# Patient Record
Sex: MALE | Race: BLACK OR AFRICAN AMERICAN | URBAN - METROPOLITAN AREA
[De-identification: names, ages, dates, MRNs, and addresses within clinical notes are randomized per-mention and may not be internally consistent; named-entity substitution may affect disease eponyms.]

---

## 2019-03-07 ENCOUNTER — INPATIENT (INPATIENT)
Facility: HOSPITAL | Age: 74
LOS: 5 days | Discharge: NURSING, REHAB OR LONG-TERM CARE | End: 2019-03-13
Attending: INTERNAL MEDICINE | Admitting: INTERNAL MEDICINE

## 2019-03-07 VITALS
DIASTOLIC BLOOD PRESSURE: 100 MMHG | SYSTOLIC BLOOD PRESSURE: 178 MMHG | TEMPERATURE: 97 F | RESPIRATION RATE: 18 BRPM | OXYGEN SATURATION: 98 % | HEART RATE: 100 BPM

## 2019-03-07 LAB
ANION GAP SERPL CALC-SCNC: 15 MMOL/L — HIGH (ref 7–14)
APPEARANCE UR: CLEAR — SIGNIFICANT CHANGE UP
APTT BLD: 36.8 SEC — SIGNIFICANT CHANGE UP (ref 27–39.2)
BASOPHILS # BLD AUTO: 0.05 K/UL — SIGNIFICANT CHANGE UP (ref 0–0.2)
BASOPHILS NFR BLD AUTO: 0.6 % — SIGNIFICANT CHANGE UP (ref 0–1)
BILIRUB UR-MCNC: NEGATIVE — SIGNIFICANT CHANGE UP
BUN SERPL-MCNC: 29 MG/DL — HIGH (ref 10–20)
CALCIUM SERPL-MCNC: 10 MG/DL — SIGNIFICANT CHANGE UP (ref 8.5–10.1)
CHLORIDE SERPL-SCNC: 98 MMOL/L — SIGNIFICANT CHANGE UP (ref 98–110)
CO2 SERPL-SCNC: 29 MMOL/L — SIGNIFICANT CHANGE UP (ref 17–32)
COLOR SPEC: YELLOW — SIGNIFICANT CHANGE UP
CREAT SERPL-MCNC: 1.7 MG/DL — HIGH (ref 0.7–1.5)
DIFF PNL FLD: ABNORMAL
EOSINOPHIL # BLD AUTO: 0.06 K/UL — SIGNIFICANT CHANGE UP (ref 0–0.7)
EOSINOPHIL NFR BLD AUTO: 0.7 % — SIGNIFICANT CHANGE UP (ref 0–8)
GLUCOSE SERPL-MCNC: 115 MG/DL — HIGH (ref 70–99)
GLUCOSE UR QL: NEGATIVE MG/DL — SIGNIFICANT CHANGE UP
HCT VFR BLD CALC: 47.9 % — SIGNIFICANT CHANGE UP (ref 42–52)
HGB BLD-MCNC: 15.9 G/DL — SIGNIFICANT CHANGE UP (ref 14–18)
IMM GRANULOCYTES NFR BLD AUTO: 0.1 % — SIGNIFICANT CHANGE UP (ref 0.1–0.3)
INR BLD: 1.08 RATIO — SIGNIFICANT CHANGE UP (ref 0.65–1.3)
KETONES UR-MCNC: NEGATIVE — SIGNIFICANT CHANGE UP
LEUKOCYTE ESTERASE UR-ACNC: ABNORMAL
LYMPHOCYTES # BLD AUTO: 2.66 K/UL — SIGNIFICANT CHANGE UP (ref 1.2–3.4)
LYMPHOCYTES # BLD AUTO: 32.7 % — SIGNIFICANT CHANGE UP (ref 20.5–51.1)
MCHC RBC-ENTMCNC: 28 PG — SIGNIFICANT CHANGE UP (ref 27–31)
MCHC RBC-ENTMCNC: 33.2 G/DL — SIGNIFICANT CHANGE UP (ref 32–37)
MCV RBC AUTO: 84.5 FL — SIGNIFICANT CHANGE UP (ref 80–94)
MONOCYTES # BLD AUTO: 0.96 K/UL — HIGH (ref 0.1–0.6)
MONOCYTES NFR BLD AUTO: 11.8 % — HIGH (ref 1.7–9.3)
NEUTROPHILS # BLD AUTO: 4.4 K/UL — SIGNIFICANT CHANGE UP (ref 1.4–6.5)
NEUTROPHILS NFR BLD AUTO: 54.1 % — SIGNIFICANT CHANGE UP (ref 42.2–75.2)
NITRITE UR-MCNC: NEGATIVE — SIGNIFICANT CHANGE UP
NRBC # BLD: 0 /100 WBCS — SIGNIFICANT CHANGE UP (ref 0–0)
PH UR: 5.5 — SIGNIFICANT CHANGE UP (ref 5–8)
PLATELET # BLD AUTO: 268 K/UL — SIGNIFICANT CHANGE UP (ref 130–400)
POTASSIUM SERPL-MCNC: 3.8 MMOL/L — SIGNIFICANT CHANGE UP (ref 3.5–5)
POTASSIUM SERPL-SCNC: 3.8 MMOL/L — SIGNIFICANT CHANGE UP (ref 3.5–5)
PROT UR-MCNC: 30 MG/DL
PROTHROM AB SERPL-ACNC: 12.4 SEC — SIGNIFICANT CHANGE UP (ref 9.95–12.87)
RBC # BLD: 5.67 M/UL — SIGNIFICANT CHANGE UP (ref 4.7–6.1)
RBC # FLD: 13.1 % — SIGNIFICANT CHANGE UP (ref 11.5–14.5)
RBC CASTS # UR COMP ASSIST: ABNORMAL /HPF
SODIUM SERPL-SCNC: 142 MMOL/L — SIGNIFICANT CHANGE UP (ref 135–146)
SP GR SPEC: 1.02 — SIGNIFICANT CHANGE UP (ref 1.01–1.03)
UROBILINOGEN FLD QL: 1 MG/DL (ref 0.2–0.2)
WBC # BLD: 8.14 K/UL — SIGNIFICANT CHANGE UP (ref 4.8–10.8)
WBC # FLD AUTO: 8.14 K/UL — SIGNIFICANT CHANGE UP (ref 4.8–10.8)
WBC UR QL: ABNORMAL /HPF

## 2019-03-07 NOTE — ED ADULT TRIAGE NOTE - CHIEF COMPLAINT QUOTE
medical evaluation, pt has hx of dementia, HTN,  showed up unannounced at daughter's doorstep from [place], unsure how he got there. Pt only has two meds with him. Family unsure of his medical status.

## 2019-03-07 NOTE — ED ADULT NURSE NOTE - OBJECTIVE STATEMENT
patient complaints of forgetfulness. Patient [facility] dementia and poor historian. Patient family reports he showed up at their house and does not know how he got there. Patient family reports they have not heard from him in 20 years. Patient denies chest pain, n/v/d.  And no SOB noted.

## 2019-03-07 NOTE — ED ADULT NURSE NOTE - CHPI ED NUR SYMPTOMS NEG
no pain/no vomiting/no chills/no decreased eating/drinking/no weakness/no dizziness/no tingling/no fever/no nausea

## 2019-03-08 DIAGNOSIS — G91.9 HYDROCEPHALUS, UNSPECIFIED: ICD-10-CM

## 2019-03-08 DIAGNOSIS — N28.9 DISORDER OF KIDNEY AND URETER, UNSPECIFIED: ICD-10-CM

## 2019-03-08 DIAGNOSIS — N39.0 URINARY TRACT INFECTION, SITE NOT SPECIFIED: ICD-10-CM

## 2019-03-08 DIAGNOSIS — F03.91 UNSPECIFIED DEMENTIA WITH BEHAVIORAL DISTURBANCE: ICD-10-CM

## 2019-03-08 LAB
AMMONIA BLD-MCNC: 33 UMOL/L — SIGNIFICANT CHANGE UP (ref 11–55)
ETHANOL SERPL-MCNC: <10 MG/DL — HIGH

## 2019-03-08 RX ORDER — CEFTRIAXONE 500 MG/1
1 INJECTION, POWDER, FOR SOLUTION INTRAMUSCULAR; INTRAVENOUS EVERY 24 HOURS
Qty: 0 | Refills: 0 | Status: DISCONTINUED | OUTPATIENT
Start: 2019-03-08 | End: 2019-03-08

## 2019-03-08 RX ORDER — INFLUENZA VIRUS VACCINE 15; 15; 15; 15 UG/.5ML; UG/.5ML; UG/.5ML; UG/.5ML
0.5 SUSPENSION INTRAMUSCULAR ONCE
Qty: 0 | Refills: 0 | Status: COMPLETED | OUTPATIENT
Start: 2019-03-08 | End: 2019-03-13

## 2019-03-08 RX ORDER — ATORVASTATIN CALCIUM 80 MG/1
40 TABLET, FILM COATED ORAL AT BEDTIME
Qty: 0 | Refills: 0 | Status: DISCONTINUED | OUTPATIENT
Start: 2019-03-08 | End: 2019-03-11

## 2019-03-08 RX ORDER — ATORVASTATIN CALCIUM 80 MG/1
40 TABLET, FILM COATED ORAL AT BEDTIME
Qty: 0 | Refills: 0 | Status: DISCONTINUED | OUTPATIENT
Start: 2019-03-08 | End: 2019-03-13

## 2019-03-08 RX ORDER — SODIUM CHLORIDE 9 MG/ML
1000 INJECTION INTRAMUSCULAR; INTRAVENOUS; SUBCUTANEOUS
Qty: 0 | Refills: 0 | Status: DISCONTINUED | OUTPATIENT
Start: 2019-03-08 | End: 2019-03-10

## 2019-03-08 RX ORDER — CEFTRIAXONE 500 MG/1
1 INJECTION, POWDER, FOR SOLUTION INTRAMUSCULAR; INTRAVENOUS ONCE
Qty: 0 | Refills: 0 | Status: COMPLETED | OUTPATIENT
Start: 2019-03-08 | End: 2019-03-08

## 2019-03-08 RX ORDER — AMLODIPINE BESYLATE 2.5 MG/1
5 TABLET ORAL DAILY
Qty: 0 | Refills: 0 | Status: DISCONTINUED | OUTPATIENT
Start: 2019-03-08 | End: 2019-03-11

## 2019-03-08 RX ADMIN — AMLODIPINE BESYLATE 5 MILLIGRAM(S): 2.5 TABLET ORAL at 17:40

## 2019-03-08 RX ADMIN — ATORVASTATIN CALCIUM 40 MILLIGRAM(S): 80 TABLET, FILM COATED ORAL at 21:19

## 2019-03-08 RX ADMIN — CEFTRIAXONE 100 GRAM(S): 500 INJECTION, POWDER, FOR SOLUTION INTRAMUSCULAR; INTRAVENOUS at 00:45

## 2019-03-08 RX ADMIN — SODIUM CHLORIDE 75 MILLILITER(S): 9 INJECTION INTRAMUSCULAR; INTRAVENOUS; SUBCUTANEOUS at 21:20

## 2019-03-08 NOTE — PHYSICAL THERAPY INITIAL EVALUATION ADULT - GAIT DEVIATIONS NOTED, PT EVAL
increased time in double stance/decreased step length/decreased weight-shifting ability/decreased jaden

## 2019-03-08 NOTE — CONSULT NOTE ADULT - SUBJECTIVE AND OBJECTIVE BOX
Neurology Consultation note    Name  RIVER BLANKENSHIP    HPI:  · HPI Objective Statement: 74 yo m hx dementia here for wandering. pt lives in [place] and somehow made his way up. pt states "I took the bus or a train." no other complaints. daughters state they don't contact father often and was concerned he was homeless. they did not expect him and were not sure what to do with him at their home. family brought him to ed for eval and placement. (08 Mar 2019 05:16)      NEURO:  PATIENT IS A 74 YO MAN WITH HX OF DEMENTIA PRESENTS AS ABOVE. DETAILS OF DEMENTIA UNKNOWN (ONSET, NATURE ETC)  PATIENT UNABLE TO GIVE ME HX OTHER THAN THAT HE IS WEAK AND HAS TROUBLE WALKING  NEURO CALLED B/C OF VENTRICULOMEGALY ON CTH OUT OF PROPORTION TO ATROPHY.  PATIENT ON AMLODIPINE AND LIPITOR         Vital Signs Last 24 Hrs  T(C): 36.9 (08 Mar 2019 04:45), Max: 36.9 (08 Mar 2019 04:45)  T(F): 98.5 (08 Mar 2019 04:45), Max: 98.5 (08 Mar 2019 04:45)  HR: 89 (08 Mar 2019 04:45) (89 - 100)  BP: 153/89 (08 Mar 2019 04:45) (153/89 - 178/100)  BP(mean): --  RR: 16 (08 Mar 2019 08:44) (16 - 20)  SpO2: 96% (08 Mar 2019 08:44) (96% - 98%)    Neurological Exam:   MS: AWAKE, ALERT, ORIENTED TO SELF ONLY, POOR INSIGHT AND FUND OF KNOWLEDGE, CANNOT GIVE ME DETAILS OF MEDICAL HX, PATIENT DID KNOW THAT HE WAS IN [place] AT SOME POINT, NO LANGUAGE DYSFUNCTION NOTED, POORLY ATTENTIVE ON MY EXAM  EOM FULL, FACE SYMMETRIC, SENSATION INTACT, NO NYSTAGMUS  5/5 STRENGTH X 5  DEPRESSED DTRS X 4  DECREASED VIBRATION TO KNEES AND DECREASED JPS AT TOES  PATIENT DECLINES TO AMBULATE FOR ME      Medications  amLODIPine   Tablet 5 milliGRAM(s) Oral daily  atorvastatin 40 milliGRAM(s) Oral at bedtime  influenza   Vaccine 0.5 milliLiter(s) IntraMuscular once  sodium chloride 0.9%. 1000 milliLiter(s) IV Continuous <Continuous>      Lab  03-07    142  |  98  |  29<H>  ----------------------------<  115<H>  3.8   |  29  |  1.7<H>    Ca    10.0      07 Mar 2019 22:30                            15.9   8.14  )-----------( 268      ( 07 Mar 2019 22:30 )             47.9               Radiology  CT Head No Cont:   EXAM:  CT BRAIN            PROCEDURE DATE:  03/08/2019            INTERPRETATION:  CLINICAL HISTORY / REASON FOR EXAM: Altered mental   status.    TECHNIQUE: Multiple axial CT images of the head were obtained from the   base of the skull to the vertex without the administration of IV   contrast. Sagittal and coronal reformats were submitted.    COMPARISON: None.      FINDINGS:    There is dilation of the ventricles out of proportion to the cortical   sulci, which may be consistent with communicating hydrocephalus in the   appropriate clinical setting. This is superimposed upon a mild degree of   parenchymal volume loss.     There is no evidence of acute intracranial hemorrhage, mass effect or   midline shift.    Gray-white differentiation is maintained. There are patchy subcortical   white matter hypodensities consistent with mild chronic microvascular   ischemic changes.    The bones are intact. Mastoid air cells are well aerated bilaterally.   Right maxillary sinus mucosal retention cysts/polyps. Mild mucosal   thickening of the left maxillary sinus. The remainder of the visualized   portions of the paranasal sinuses are unremarkable.    Beam hardening artifact is noted overlying the brain stem and posterior   fossa which is inherent to CT in this location.      IMPRESSION:     Dilation of the ventricles out of portion to the cortical sulci, which   may be consistent with communicating hydrocephalus in the appropriate   clinical setting.          Assessment: 74 YO MAN WITH DEMENTIA P/W AMS. MADE HIS WAY FROM [place] TO , DETAILS NOT CLEAR  NEURO CALLED TO EVAL FOR NPH BUT PT DECLINES TO AMBULATE STATING THAT HE IS TOO WEAK.  CANNOT THEREFORE MAKE THIS DIAGNOSIS. OFTEN GAIT INSTABILITY PRECEDES THE ONSET OF DEMENTIA WHICH DOES NOT SEEM TO BE THE CASE HERE  ON EXAM THERE ARE NEUROPATHIC FINDINGS WHICH MAY BE CONTRIBUTING TO GAIT INSTABILITY    Plan:  REEG  CHECK VIT B12, THIAMINE, FOLATE, THYROID PANEL, HBA1C  EMG/NCS AS OUTPT  PT/REHAB  WILL FOLLOW AND RE-EVAL GAIT  PLEASE CALL WITH ANY QUESTIONS

## 2019-03-08 NOTE — ED PROVIDER NOTE - CARE PLAN
Principal Discharge DX:	Hydrocephalus  Secondary Diagnosis:	Renal dysfunction  Secondary Diagnosis:	Urinary tract infection without hematuria, site unspecified

## 2019-03-08 NOTE — CHART NOTE - NSCHARTNOTEFT_GEN_A_CORE
Spoke to pt's ex wife and one of his daughters.  They want to speak to case management about placement.  Family hasn't spoken to the patient in 20-30 years.  They will not accept the pt to their home.      I looked through the pt's belongings- he takes hctz that I will hold due to jaret vs. ckd and Atorvastatin that I ordered.     D/C IV abx    check routine labs    psych eval    continue IVF and check BMP in am Spoke to pt's ex wife and one of his daughters.  They want to speak to case management about placement.  Family hasn't spoken to the patient in 20-30 years.  They will not accept the pt to their home.      I looked through the pt's belongings- he takes hctz that I will hold due to jaret vs. ckd and Atorvastatin that I ordered.     D/C IV abx    check routine labs and urine tox screen    psych eval appreciated     continue IVF and check BMP in am

## 2019-03-08 NOTE — PHYSICAL THERAPY INITIAL EVALUATION ADULT - IMPAIRMENTS FOUND, PT EVAL
ergonomics and body mechanics/posture/aerobic capacity/endurance/poor safety awareness/gait, locomotion, and balance/muscle strength

## 2019-03-08 NOTE — CONSULT NOTE ADULT - SUBJECTIVE AND OBJECTIVE BOX
Reviewed and referred to chart notes.     History of Present Illness: 	  · HPI Objective Statement: 74 yo m hx dementia here for wandering. pt lives in [place] and somehow made his way up. pt states "I took the bus or a train." no other complaints. daughters state they don't contact father often and was concerned he was homeless. they did not expect him and were not sure what to do with him at their home. family brought him to ed for eval and placement.	    pt admits he is aware of his memory problems. he manages by keeping his routine and getting help when needed. he was living on his own in [place]. he is able to provide basic information about his medical dx htn and where he was getting care in [place]. details are sketch. past hx about his working for [employer] in [employer] in [place] prior to group home. denies any psych hx. denies any mood or anxiety sx. no psychosis. no s/h ideation.    alert ox place, person and month year. mood stable. no psychosis. memory deficit both short and long term memory. he is aware of his deficits and is willing to accept help.

## 2019-03-08 NOTE — CONSULT NOTE ADULT - ASSESSMENT
neuro cognitive disorder moderate    pt is functional   no need for medications  case management  for after care.

## 2019-03-08 NOTE — ED PROVIDER NOTE - OBJECTIVE STATEMENT
72 yo m hx dementia here for wandering. pt lives in [place] and somehow made his way up. pt states "I took the bus or a train." no other complaints. daughters state they don't contact father often and was concerned he was homeless. they did not expect him and were not sure what to do with him at their home. family brought him to ed for eval and placement.

## 2019-03-08 NOTE — ED PROVIDER NOTE - CLINICAL SUMMARY MEDICAL DECISION MAKING FREE TEXT BOX
pt with dementia. will screen for lyte abnormality, infection, intracranial process. w/o revealing uti, renal dysfunction (baseline unclear), possible NPH. will admit to geriatric/frailty unit for neuro consult, tx of uti, placement

## 2019-03-08 NOTE — H&P ADULT - NSHPLABSRESULTS_GEN_ALL_CORE
15.9   8.14  )-----------( 268      ( 07 Mar 2019 22:30 )             47.9     03-07    142  |  98  |  29<H>  ----------------------------<  115<H>  3.8   |  29  |  1.7<H>    Ca    10.0      07 Mar 2019 22:30            Urinalysis Basic - ( 07 Mar 2019 22:30 )    Color: Yellow / Appearance: Clear / S.025 / pH: x  Gluc: x / Ketone: Negative  / Bili: Negative / Urobili: 1.0 mg/dL   Blood: x / Protein: 30 mg/dL / Nitrite: Negative   Leuk Esterase: Small / RBC: 11-25 /HPF / WBC 6-10 /HPF   Sq Epi: x / Non Sq Epi: x / Bacteria: x      PT/INR - ( 07 Mar 2019 22:30 )   PT: 12.40 sec;   INR: 1.08 ratio         PTT - ( 07 Mar 2019 22:30 )  PTT:36.8 sec  Lactate Trend        CAPILLARY BLOOD GLUCOSE

## 2019-03-08 NOTE — PROGRESS NOTE ADULT - SUBJECTIVE AND OBJECTIVE BOX
Hospitalist on call;  Informed by RN, family brought in patient's medications from home; HCTZ 25mg po daily and atorvastatin 40mg po daily.   Reviewed patient's lab result and noted an increase in create consistent with KEMAR. Base line unknown.     Plan  Hold HCTZ due to KEMAR  Start Statin home dose.

## 2019-03-08 NOTE — PHYSICAL THERAPY INITIAL EVALUATION ADULT - GENERAL OBSERVATIONS, REHAB EVAL
9:55 - 10:15. Chart reviewed. Patient available to be seen for physical therapy. Patient encountered semi-reclined in bed, +IV.  Agreeable for PT.

## 2019-03-08 NOTE — ED PROVIDER NOTE - PHYSICAL EXAMINATION
PHYSICAL EXAM:    Constitutional: awake, alert, NAD  Eyes: EOMI, no conj injection  HENT: NC AT  Back: no c/t/l spine ttp  Respiratory: no respiratory distress, breath sounds equal b/l, no wheezing, rhonchi or stridor.   Cardiovascular: RRR nml S1S2  Gastrointestinal: soft, no masses, nontender, nondistended. No guarding or rebound.   Extremities: no peripheral edema  Neurological: AAOx3, CN II-XII grossly intact, no focal numbness or weakness, shuffling gate w/ cane  Skin: no rash  Musculoskeletal: no gross deformity

## 2019-03-09 LAB
ANION GAP SERPL CALC-SCNC: 9 MMOL/L — SIGNIFICANT CHANGE UP (ref 7–14)
BUN SERPL-MCNC: 20 MG/DL — SIGNIFICANT CHANGE UP (ref 10–20)
CALCIUM SERPL-MCNC: 8.6 MG/DL — SIGNIFICANT CHANGE UP (ref 8.5–10.1)
CHLORIDE SERPL-SCNC: 102 MMOL/L — SIGNIFICANT CHANGE UP (ref 98–110)
CO2 SERPL-SCNC: 31 MMOL/L — SIGNIFICANT CHANGE UP (ref 17–32)
CREAT SERPL-MCNC: 1.4 MG/DL — SIGNIFICANT CHANGE UP (ref 0.7–1.5)
FOLATE SERPL-MCNC: 17.1 NG/ML — SIGNIFICANT CHANGE UP
GLUCOSE SERPL-MCNC: 114 MG/DL — HIGH (ref 70–99)
POTASSIUM SERPL-MCNC: 4.1 MMOL/L — SIGNIFICANT CHANGE UP (ref 3.5–5)
POTASSIUM SERPL-SCNC: 4.1 MMOL/L — SIGNIFICANT CHANGE UP (ref 3.5–5)
SODIUM SERPL-SCNC: 142 MMOL/L — SIGNIFICANT CHANGE UP (ref 135–146)
TSH SERPL-MCNC: 0.13 UIU/ML — LOW (ref 0.27–4.2)
VIT B12 SERPL-MCNC: 326 PG/ML — SIGNIFICANT CHANGE UP (ref 232–1245)

## 2019-03-09 RX ORDER — HEPARIN SODIUM 5000 [USP'U]/ML
5000 INJECTION INTRAVENOUS; SUBCUTANEOUS EVERY 8 HOURS
Qty: 0 | Refills: 0 | Status: DISCONTINUED | OUTPATIENT
Start: 2019-03-09 | End: 2019-03-13

## 2019-03-09 RX ADMIN — ATORVASTATIN CALCIUM 40 MILLIGRAM(S): 80 TABLET, FILM COATED ORAL at 21:30

## 2019-03-09 RX ADMIN — AMLODIPINE BESYLATE 5 MILLIGRAM(S): 2.5 TABLET ORAL at 05:22

## 2019-03-09 RX ADMIN — SODIUM CHLORIDE 75 MILLILITER(S): 9 INJECTION INTRAMUSCULAR; INTRAVENOUS; SUBCUTANEOUS at 09:57

## 2019-03-09 RX ADMIN — HEPARIN SODIUM 5000 UNIT(S): 5000 INJECTION INTRAVENOUS; SUBCUTANEOUS at 21:30

## 2019-03-09 RX ADMIN — HEPARIN SODIUM 5000 UNIT(S): 5000 INJECTION INTRAVENOUS; SUBCUTANEOUS at 14:56

## 2019-03-09 NOTE — PROGRESS NOTE ADULT - SUBJECTIVE AND OBJECTIVE BOX
RIVER BLANKENSHIP  73y, Male  Allergy: Allergy Status Unknown    Hospital Day: 1d    Patient seen and examined earlier today. No acute overnight events. Pt denies fevers, chills, N/V/D. Started on REEG today.    PMH/PSH:  PAST MEDICAL & SURGICAL HISTORY:  Dementia with behavioral disturbance, unspecified dementia type        VITALS:  T(F): 98 (19 @ 06:09), Max: 98.7 (19 @ 22:38)  HR: 72 (19 @ 06:09)  BP: 147/82 (19 @ 06:09) (133/7 - 148/81)  RR: 18 (19 @ 06:09)  SpO2: --    TESTS & MEASUREMENTS:  Weight (Kg): 84.8 (19 @ 04:45)  BMI (kg/m2): 29.3 ()    19 @ 07:01  -  19 @ 07:00  --------------------------------------------------------  IN: 900 mL / OUT: 500 mL / NET: 400 mL                            15.9   8.14  )-----------( 268      ( 07 Mar 2019 22:30 )             47.9     PT/INR - ( 07 Mar 2019 22:30 )   PT: 12.40 sec;   INR: 1.08 ratio         PTT - ( 07 Mar 2019 22:30 )  PTT:36.8 sec      142  |  102  |  20  ----------------------------<  114<H>  4.1   |  31  |  1.4    Ca    8.6      09 Mar 2019 07:26              Urinalysis Basic - ( 07 Mar 2019 22:30 )    Color: Yellow / Appearance: Clear / S.025 / pH: x  Gluc: x / Ketone: Negative  / Bili: Negative / Urobili: 1.0 mg/dL   Blood: x / Protein: 30 mg/dL / Nitrite: Negative   Leuk Esterase: Small / RBC: 11-25 /HPF / WBC 6-10 /HPF   Sq Epi: x / Non Sq Epi: x / Bacteria: x        RECENT DIAGNOSTIC ORDERS:  EEG:   Transport: Portable  Hemorrhage:No  Brain Death: No  MI:No  Sickle Cell:No   Stimulants:No  Anti-Convulsants:No   Anti-Depressants:No  Contr Substances:No   Ordering Provider's Pager/Contact #:[phone] (19 @ 12:33)      MEDICATIONS:  MEDICATIONS  (STANDING):  amLODIPine   Tablet 5 milliGRAM(s) Oral daily  atorvastatin 40 milliGRAM(s) Oral at bedtime  atorvastatin 40 milliGRAM(s) Oral at bedtime  influenza   Vaccine 0.5 milliLiter(s) IntraMuscular once  sodium chloride 0.9%. 1000 milliLiter(s) (75 mL/Hr) IV Continuous <Continuous>    MEDICATIONS  (PRN):      HOME MEDICATIONS:      PHYSICAL EXAM:  GENERAL: elderly male, in NAD, well-developed  HEAD:  Atraumatic, Normocephalic  EYES: EOMI, PERRLA  CHEST/LUNG: Clear to auscultation bilaterally; No wheeze  HEART: Regular rate and rhythm; No murmurs, rubs, or gallops  ABDOMEN: Soft, Nontender, Nondistended; Bowel sounds present  EXTREMITIES:  2+ Peripheral Pulses, No clubbing, cyanosis, or edema  PSYCH: AAOx3  SKIN: No rashes or lesions

## 2019-03-09 NOTE — PROGRESS NOTE ADULT - ASSESSMENT
74 yo M with PMHx of dementia, HTN and HLD brought in after he was found wandering. Pt lives in [place] and somehow made his to [place], he is unsure of details. Pt is estranged from his family and brought to the hospital by them for placement.    Confusion possibly 2/2 dementia, hydrocephalus or thyroid disorder (less likely)  - CT head showed Dilation of the ventricles out of portion to the cortical sulci, which may be consistent with communicating hydrocephalus in the appropriate   clinical setting  - per neurp pt refused to ambulate therefore difficult to establish diagnosis   - vit b12, folate, ammonia wnl  - rpr pending  - TSH low  - f/u REEG results  - EMG/NCS as outpt  - PT consult  - neuro f/u    KEMAR likely on CKD III (improving)  - c/w IVFs  - monitor renal function  - continue to hold HCTz    Low TSH r/o hyperthyroidism  - f/u free t3 and t4    HTN  - c/w norvasc    HLD  - c/w statin    DVT ppx  - start HSQ    Dispo: per family pt may be homeless, SW/CM f/u

## 2019-03-10 LAB
ANION GAP SERPL CALC-SCNC: 8 MMOL/L — SIGNIFICANT CHANGE UP (ref 7–14)
BUN SERPL-MCNC: 15 MG/DL — SIGNIFICANT CHANGE UP (ref 10–20)
CALCIUM SERPL-MCNC: 8.9 MG/DL — SIGNIFICANT CHANGE UP (ref 8.5–10.1)
CHLORIDE SERPL-SCNC: 103 MMOL/L — SIGNIFICANT CHANGE UP (ref 98–110)
CO2 SERPL-SCNC: 30 MMOL/L — SIGNIFICANT CHANGE UP (ref 17–32)
CREAT SERPL-MCNC: 1.5 MG/DL — SIGNIFICANT CHANGE UP (ref 0.7–1.5)
GLUCOSE SERPL-MCNC: 114 MG/DL — HIGH (ref 70–99)
HCT VFR BLD CALC: 44.5 % — SIGNIFICANT CHANGE UP (ref 42–52)
HGB BLD-MCNC: 14.1 G/DL — SIGNIFICANT CHANGE UP (ref 14–18)
MCHC RBC-ENTMCNC: 27.5 PG — SIGNIFICANT CHANGE UP (ref 27–31)
MCHC RBC-ENTMCNC: 31.7 G/DL — LOW (ref 32–37)
MCV RBC AUTO: 86.9 FL — SIGNIFICANT CHANGE UP (ref 80–94)
NRBC # BLD: 0 /100 WBCS — SIGNIFICANT CHANGE UP (ref 0–0)
PLATELET # BLD AUTO: 204 K/UL — SIGNIFICANT CHANGE UP (ref 130–400)
POTASSIUM SERPL-MCNC: 4.8 MMOL/L — SIGNIFICANT CHANGE UP (ref 3.5–5)
POTASSIUM SERPL-SCNC: 4.8 MMOL/L — SIGNIFICANT CHANGE UP (ref 3.5–5)
RBC # BLD: 5.12 M/UL — SIGNIFICANT CHANGE UP (ref 4.7–6.1)
RBC # FLD: 12.9 % — SIGNIFICANT CHANGE UP (ref 11.5–14.5)
SODIUM SERPL-SCNC: 141 MMOL/L — SIGNIFICANT CHANGE UP (ref 135–146)
T PALLIDUM AB TITR SER: NEGATIVE — SIGNIFICANT CHANGE UP
T3FREE SERPL-MCNC: 2.17 PG/ML — SIGNIFICANT CHANGE UP (ref 1.8–4.6)
T4 FREE SERPL-MCNC: 1 NG/DL — SIGNIFICANT CHANGE UP (ref 0.9–1.8)
WBC # BLD: 5.31 K/UL — SIGNIFICANT CHANGE UP (ref 4.8–10.8)
WBC # FLD AUTO: 5.31 K/UL — SIGNIFICANT CHANGE UP (ref 4.8–10.8)

## 2019-03-10 RX ADMIN — HEPARIN SODIUM 5000 UNIT(S): 5000 INJECTION INTRAVENOUS; SUBCUTANEOUS at 22:20

## 2019-03-10 RX ADMIN — HEPARIN SODIUM 5000 UNIT(S): 5000 INJECTION INTRAVENOUS; SUBCUTANEOUS at 06:21

## 2019-03-10 RX ADMIN — HEPARIN SODIUM 5000 UNIT(S): 5000 INJECTION INTRAVENOUS; SUBCUTANEOUS at 15:35

## 2019-03-10 RX ADMIN — SODIUM CHLORIDE 75 MILLILITER(S): 9 INJECTION INTRAMUSCULAR; INTRAVENOUS; SUBCUTANEOUS at 00:16

## 2019-03-10 RX ADMIN — ATORVASTATIN CALCIUM 40 MILLIGRAM(S): 80 TABLET, FILM COATED ORAL at 22:19

## 2019-03-10 RX ADMIN — AMLODIPINE BESYLATE 5 MILLIGRAM(S): 2.5 TABLET ORAL at 06:20

## 2019-03-10 NOTE — PROGRESS NOTE ADULT - ASSESSMENT
72 yo M with PMHx of dementia, HTN and HLD brought in after he was found wandering. Pt lives in [place] and somehow made his to [place], he is unsure of details. Pt is estranged from his family and brought to the hospital by them for placement.    Confusion possibly 2/2 dementia, hydrocephalus or thyroid disorder (less likely)  - CT head showed Dilation of the ventricles out of portion to the cortical sulci, which may be consistent with communicating hydrocephalus in the appropriate   clinical setting  - per neuo pt refused to ambulate therefore difficult to establish diagnosis   - vit b12, folate, ammonia and rpr wnl  - TSH low  - REEG normal  - EMG/NCS as outpt  - PT consult  - neuro f/u  - mental status appears to be improving however unclear baseline, AAOx 3 today and able to give some prior hx however not very detailed    KEMAR likely on CKD III (improving)  - d/c IVFs as Cr stable and pt tolerating PO diet, encourage oral hydration  - monitor renal function  - continue to hold HCTz    Low TSH r/o hyperthyroidism  - TSH low  - f/u free t3 and t4 (sent, results pending)    HTN  - c/w norvasc    HLD  - c/w statin    DVT ppx  - c/w HSQ    Dispo: per family pt may be homeless, SW/CM f/u

## 2019-03-10 NOTE — PROGRESS NOTE ADULT - SUBJECTIVE AND OBJECTIVE BOX
RIVER BLANKENSHIP  73y, Male  Allergy: Allergy Status Unknown    Hospital Day: 2d    Patient seen and examined earlier today. No acute overnight events. Patient reports feeling well. States that he traveled here from [place] via bus to visit his family although he did not make contact with family prior to visit. States that he lived with his uncles wife who ? has now passed away. Denies headache, N/V, dizziness, or fevers.    PMH/PSH:  PAST MEDICAL & SURGICAL HISTORY:  Dementia with behavioral disturbance, unspecified dementia type        VITALS:  T(F): 97 (03-10-19 @ 06:05), Max: 98.2 (03-09-19 @ 13:39)  HR: 72 (03-10-19 @ 06:05)  BP: 132/91 (03-10-19 @ 06:05) (132/91 - 145/88)  RR: 17 (03-10-19 @ 06:05)  SpO2: --    TESTS & MEASUREMENTS:  Weight (Kg):   BMI (kg/m2): 29.3 (03-08)    03-08-19 @ 07:01  -  03-09-19 @ 07:00  --------------------------------------------------------  IN: 900 mL / OUT: 500 mL / NET: 400 mL    03-09-19 @ 06:01  -  03-10-19 @ 07:00  --------------------------------------------------------  IN: 1000 mL / OUT: 1500 mL / NET: -500 mL    03-10-19 @ 07:01  -  03-10-19 @ 11:21  --------------------------------------------------------  IN: 0 mL / OUT: 600 mL / NET: -600 mL                            14.1   5.31  )-----------( 204      ( 10 Mar 2019 07:22 )             44.5       03-10    141  |  103  |  15  ----------------------------<  114<H>  4.8   |  30  |  1.5    Ca    8.9      10 Mar 2019 07:22      RECENT DIAGNOSTIC ORDERS:  Free Triiodothyronine, Serum: AM Sched. Collection: 10-Mar-2019 04:30 (03-09-19 @ 12:27)  Free Thyroxine, Serum: AM Sched. Collection: 10-Mar-2019 04:30 (03-09-19 @ 12:27)      MEDICATIONS:  MEDICATIONS  (STANDING):  amLODIPine   Tablet 5 milliGRAM(s) Oral daily  atorvastatin 40 milliGRAM(s) Oral at bedtime  atorvastatin 40 milliGRAM(s) Oral at bedtime  heparin  Injectable 5000 Unit(s) SubCutaneous every 8 hours  influenza   Vaccine 0.5 milliLiter(s) IntraMuscular once    MEDICATIONS  (PRN):      HOME MEDICATIONS:      PHYSICAL EXAM:  GENERAL: elderly male, in NAD, well-developed  HEAD:  Atraumatic, Normocephalic  CHEST/LUNG: Clear to auscultation bilaterally  HEART: Regular rate and rhythm; No murmurs, rubs, or gallops  ABDOMEN: Soft, Nontender, Nondistended; Bowel sounds present  EXTREMITIES:  2+ Peripheral Pulses, No edema  PSYCH: AAOx3  SKIN: No rashes or lesions

## 2019-03-11 LAB
ANION GAP SERPL CALC-SCNC: 9 MMOL/L — SIGNIFICANT CHANGE UP (ref 7–14)
BUN SERPL-MCNC: 15 MG/DL — SIGNIFICANT CHANGE UP (ref 10–20)
CALCIUM SERPL-MCNC: 9.2 MG/DL — SIGNIFICANT CHANGE UP (ref 8.5–10.1)
CHLORIDE SERPL-SCNC: 101 MMOL/L — SIGNIFICANT CHANGE UP (ref 98–110)
CO2 SERPL-SCNC: 28 MMOL/L — SIGNIFICANT CHANGE UP (ref 17–32)
CREAT SERPL-MCNC: 1.3 MG/DL — SIGNIFICANT CHANGE UP (ref 0.7–1.5)
GLUCOSE SERPL-MCNC: 155 MG/DL — HIGH (ref 70–99)
POTASSIUM SERPL-MCNC: 4.4 MMOL/L — SIGNIFICANT CHANGE UP (ref 3.5–5)
POTASSIUM SERPL-SCNC: 4.4 MMOL/L — SIGNIFICANT CHANGE UP (ref 3.5–5)
SODIUM SERPL-SCNC: 138 MMOL/L — SIGNIFICANT CHANGE UP (ref 135–146)

## 2019-03-11 RX ORDER — ACETAMINOPHEN 500 MG
650 TABLET ORAL ONCE
Qty: 0 | Refills: 0 | Status: COMPLETED | OUTPATIENT
Start: 2019-03-11 | End: 2019-03-11

## 2019-03-11 RX ORDER — AMLODIPINE BESYLATE 2.5 MG/1
10 TABLET ORAL DAILY
Qty: 0 | Refills: 0 | Status: DISCONTINUED | OUTPATIENT
Start: 2019-03-12 | End: 2019-03-13

## 2019-03-11 RX ADMIN — Medication 650 MILLIGRAM(S): at 16:00

## 2019-03-11 RX ADMIN — HEPARIN SODIUM 5000 UNIT(S): 5000 INJECTION INTRAVENOUS; SUBCUTANEOUS at 21:17

## 2019-03-11 RX ADMIN — AMLODIPINE BESYLATE 5 MILLIGRAM(S): 2.5 TABLET ORAL at 05:09

## 2019-03-11 RX ADMIN — Medication 650 MILLIGRAM(S): at 16:39

## 2019-03-11 RX ADMIN — HEPARIN SODIUM 5000 UNIT(S): 5000 INJECTION INTRAVENOUS; SUBCUTANEOUS at 16:01

## 2019-03-11 RX ADMIN — HEPARIN SODIUM 5000 UNIT(S): 5000 INJECTION INTRAVENOUS; SUBCUTANEOUS at 05:08

## 2019-03-11 NOTE — PROGRESS NOTE ADULT - ASSESSMENT
74 yo M with PMHx of dementia, HTN and HLD brought in after he was found wandering. Pt lives in [place] and somehow made his to [place], he is unsure of details. Pt is estranged from his family and brought to the hospital by them for placement.    Confusion possibly 2/2 dementia, hydrocephalus or thyroid disorder (less likely)  - CT head showed Dilation of the ventricles out of portion to the cortical sulci, which may be consistent with communicating hydrocephalus in the appropriate   clinical setting  - neuro to return and assess gait, recommend full dementia eval and EMG/NCS as outpt  - vit b12, folate, ammonia and rpr wnl  - TSH low  - REEG normal  - f/u PT recs  - neuro f/u  - mental status appears to be improving however unclear baseline, AAOx 3 today and able to give some prior hx however not very detailed    KEMAR likely on CKD III (improving)  - s/p IVFs   - pt possibly at baseline renal function now  - monitor renal function  - continue to hold HCTz    Low TSH, r/o hyperthyroidism  - TSH low  - free t3 and t4 wnl  - repeat TFT as outpt    HTN  - BP elevated  - increase norvasc to 10mg daily    HLD  - c/w statin    DVT ppx  - c/w HSQ    Dispo: per family pt may be homeless, SW/CM f/u 74 yo M with PMHx of dementia, HTN and HLD brought in after he was found wandering. Pt lives in [place] and somehow made his to [place], he is unsure of details. Pt is estranged from his family and brought to the hospital by them for placement.    Confusion possibly 2/2 dementia, hydrocephalus (less likely)  - CT head showed Dilation of the ventricles out of portion to the cortical sulci, which may be consistent with communicating hydrocephalus in the appropriate   clinical setting  - neuro to return and assess gait, recommend full dementia eval and EMG/NCS as outpt  - vit b12, folate, ammonia and rpr wnl  - TSH low  - REEG normal  - f/u PT recs  - neuro f/u  - mental status appears to be improving however unclear baseline, AAOx 3 today and able to give some prior hx however not very detailed    KEMAR likely on CKD III (improving)  - s/p IVFs   - pt possibly at baseline renal function now  - monitor renal function  - continue to hold HCTz    Low TSH, r/o hyperthyroidism  - TSH low  - free t3 and t4 wnl  - repeat TFT as outpt    HTN  - BP elevated  - increase norvasc to 10mg daily    HLD  - c/w statin    DVT ppx  - c/w HSQ    Dispo: per family pt may be homeless, SW/CM f/u

## 2019-03-11 NOTE — CONSULT NOTE ADULT - SUBJECTIVE AND OBJECTIVE BOX
Patient is a 73y old  Male who presents with a chief complaint of altered mental status    HPI:  · HPI Objective Statement: 74 yo male with h/o htn, hld , dementia brought in to hospital after he was found wandering. pt lives in [place] and somehow made his way up . pt states he took  train to [place] . Patient states he used to live in [facility] 18 years ago and then moved to [place] and lives alone. no other complaints. as per history obtained by primary team upon contacting daughters they were told that  they don't contact father often and were concerned he was homeless. they did not expect him and were not sure what to do with him at their home. family brought him to ed for eval and placement.  during his current stay at [facility] he was worked up for AMS and neurology as well as psychiatry teams were consulted CTH reported negative as well as the infectious work up .No acute complaints      PAST MEDICAL & SURGICAL HISTORY:  Dementia with behavioral disturbance, unspecified dementia type      SOCIAL HX:   Smoking  denies                       ETOH       denies                       FAMILY HISTORY: patient unsure       Allergies none      PHYSICAL EXAM  Vital Signs Last 24 Hrs  T(C): 35.7 (11 Mar 2019 05:11), Max: 36.5 (10 Mar 2019 14:17)  T(F): 96.2 (11 Mar 2019 05:11), Max: 97.7 (10 Mar 2019 14:17)  HR: 60 (11 Mar 2019 05:11) (60 - 71)  BP: 157/94 (11 Mar 2019 05:11) (153/93 - 170/90)  BP(mean): --  RR: 16 (11 Mar 2019 08:04) (16 - 18)  SpO2: 99% (11 Mar 2019 08:04) (99% - 99%)  I&O's Summary    10 Mar 2019 07:01  -  11 Mar 2019 07:00  --------------------------------------------------------  IN: 0 mL / OUT: 2300 mL / NET: -2300 mL        General: Comfortable in bed  HEENT:  PERRLA  Lymph node: No palpable LN             Lungs: CTA   Cardiovascular: RRR, S1S2  Abdomen: BS+ve; soft non tender  Extremities: No LE edema  Skin:  No evident Rash  Neurological:  AAOx3; No focal deficit      LABS:                          14.1   5.31  )-----------( 204      ( 10 Mar 2019 07:22 )             44.5                                               03-10    141  |  103  |  15  ----------------------------<  114<H>  4.8   |  30  |  1.5    Ca    8.9      10 Mar 2019 07:22                                                     MEDICATIONS  (STANDING):  amLODIPine   Tablet 5 milliGRAM(s) Oral daily  atorvastatin 40 milliGRAM(s) Oral at bedtime  atorvastatin 40 milliGRAM(s) Oral at bedtime  heparin  Injectable 5000 Unit(s) SubCutaneous every 8 hours  influenza   Vaccine 0.5 milliLiter(s) IntraMuscular once    Radiology:   < from: CT Head No Cont (03.08.19 @ 00:11) >  Dilation of the ventricles out of portion to the cortical sulci, which   may be consistent with communicating hydrocephalus in the appropriate   clinical setting.    Mild chronic microvascular ischemic changes.    No evidence for acute intracranial hemorrhage or acute territorial infarct    < end of copied text >    < from: Xray Chest 1 View-PORTABLE IMMEDIATE (03.07.19 @ 22:32) >  No radiographic evidence of acute cardiopulmonary disease.    < end of copied text > Patient is a 73y old  Male who presents with an altered mental status    HPI:  74 yo male with h/o htn, hld , dementia brought in to hospital after he was found wandering. pt lives in [place] and somehow made his way up . pt states he took  train to [place] . Patient states he used to live in [place] ([facility]) 18 years ago and then moved to [place] and lives alone. no other complaints. as per history obtained by primary team upon contacting daughters they were told that  they don't contact father often and were concerned he was homeless. they did not expect him and were not sure what to do with him at their home. family brought him to ed for eval and placement.  during his current stay at [facility] he was worked up for AMS and neurology as well as psychiatry teams were consulted CTH reported negative as well as the infectious work up .  Currently he has no acute complaints, able to follow commands, has good attention, but does not recall what happened when he left the train to get to [place].     PAST MEDICAL & SURGICAL HISTORY:  Dementia with behavioral disturbance, unspecified dementia type      SOCIAL HX:   Smoking  denies                       ETOH       denies                       FAMILY HISTORY: patient unsure       Allergies none      PHYSICAL EXAM  Vital Signs Last 24 Hrs  T(C): 35.7 (11 Mar 2019 05:11), Max: 36.5 (10 Mar 2019 14:17)  T(F): 96.2 (11 Mar 2019 05:11), Max: 97.7 (10 Mar 2019 14:17)  HR: 60 (11 Mar 2019 05:11) (60 - 71)  BP: 157/94 (11 Mar 2019 05:11) (153/93 - 170/90)  BP(mean): --  RR: 16 (11 Mar 2019 08:04) (16 - 18)  SpO2: 99% (11 Mar 2019 08:04) (99% - 99%)  I&O's Summary    10 Mar 2019 07:01  -  11 Mar 2019 07:00  --------------------------------------------------------  IN: 0 mL / OUT: 2300 mL / NET: -2300 mL        General: Comfortable in bed  HEENT:  PERRLA  Lymph node: No palpable LN             Lungs: CTA   Cardiovascular: RRR, S1S2  Abdomen: BS+ve; soft non tender  Extremities: No LE edema  Skin:  No evident Rash  Neurological:  AAOx3; No focal deficit      LABS:                          14.1   5.31  )-----------( 204      ( 10 Mar 2019 07:22 )             44.5                                               03-10    141  |  103  |  15  ----------------------------<  114<H>  4.8   |  30  |  1.5    Ca    8.9      10 Mar 2019 07:22                                               MEDICATIONS  (STANDING):  amLODIPine   Tablet 5 milliGRAM(s) Oral daily  atorvastatin 40 milliGRAM(s) Oral at bedtime  heparin  Injectable 5000 Unit(s) SubCutaneous every 8 hours  influenza   Vaccine 0.5 milliLiter(s) IntraMuscular once    Radiology:   < from: CT Head No Cont (03.08.19 @ 00:11) >  Dilation of the ventricles out of portion to the cortical sulci, which   may be consistent with communicating hydrocephalus in the appropriate   clinical setting.    Mild chronic microvascular ischemic changes.    No evidence for acute intracranial hemorrhage or acute territorial infarct    < end of copied text >    < from: Xray Chest 1 View-PORTABLE IMMEDIATE (03.07.19 @ 22:32) >  No radiographic evidence of acute cardiopulmonary disease.    < end of copied text >

## 2019-03-11 NOTE — PROGRESS NOTE ADULT - SUBJECTIVE AND OBJECTIVE BOX
Neurology Follow up note    Name  RIVER BLANKENSHIP    HPI:  · HPI Objective Statement: 72 yo m hx dementia here for wandering. pt lives in [place] and somehow made his way up. pt states "I took the bus or a train." no other complaints. daughters state they don't contact father often and was concerned he was homeless. they did not expect him and were not sure what to do with him at their home. family brought him to ed for eval and placement. (08 Mar 2019 05:16)      Interval History:    came back to eval patient gait  once again he declines to be evaluated telling me he is weak and asking if i can come back   he is otherwise cooperative w/ rest of exam and MS has improved      Vital Signs Last 24 Hrs  T(C): 35.7 (11 Mar 2019 05:11), Max: 36.5 (10 Mar 2019 14:17)  T(F): 96.2 (11 Mar 2019 05:11), Max: 97.7 (10 Mar 2019 14:17)  HR: 60 (11 Mar 2019 05:11) (60 - 71)  BP: 157/94 (11 Mar 2019 05:11) (153/93 - 170/90)  BP(mean): --  RR: 16 (11 Mar 2019 08:04) (16 - 18)  SpO2: 99% (11 Mar 2019 08:04) (99% - 99%)    Neurological Exam:   Mental status: Awake, alert and oriented x3  Cranial nerves: Pupils equally round and reactive to light, visual fields full, no nystagmus, extraocular muscles intact, V1 through V3 intact bilaterally and symmetric, face symmetric, hearing intact to finger rub, palate elevation symmetric, tongue was midline.  Motor:  MRC grading 5/5 b/l UE/LE.   strength 5/5.  Normal tone and bulk.  No abnormal movements.    Sensation: Intact to light touch, proprioception, and pinprick.   Coordination: No dysmetria on finger-to-nose and heel-to-shin.  No dysdiadokinesia.  Reflexes: 2+ in bilateral UE/LE, downgoing toes bilaterally. (-) Morgan.  Gait: patient declines again, asking me to come back another time    Medications  amLODIPine   Tablet 5 milliGRAM(s) Oral daily  atorvastatin 40 milliGRAM(s) Oral at bedtime  atorvastatin 40 milliGRAM(s) Oral at bedtime  heparin  Injectable 5000 Unit(s) SubCutaneous every 8 hours  influenza   Vaccine 0.5 milliLiter(s) IntraMuscular once      Lab  03-10    141  |  103  |  15  ----------------------------<  114<H>  4.8   |  30  |  1.5    Ca    8.9      10 Mar 2019 07:22                            14.1   5.31  )-----------( 204      ( 10 Mar 2019 07:22 )             44.5               Radiology      Assessment: 72 yo man with dementia   p/w ams (now improved ) adn gait disturbance at least in part due to neuropathy  REEG was non epileptiform  again, i cannot fully assess for nph but chronologic progression makes it less likely    Plan:  pt/rehab'  will return to re-eval gait  full dementia eval as outpatient  emg/ncs as outpatient

## 2019-03-11 NOTE — PROGRESS NOTE ADULT - SUBJECTIVE AND OBJECTIVE BOX
RIVER BLANKENSHIP  73y, Male  Allergy: Allergy Status Unknown    Hospital Day: 3d    Patient seen and examined earlier today. No acute overnight events. Patient denies headache, N/V or dizziness. Seen by neuro this am, they will return to assess gait.     PMH/PSH:  PAST MEDICAL & SURGICAL HISTORY:  Dementia with behavioral disturbance, unspecified dementia type        VITALS:  T(F): 96.2 (03-11-19 @ 05:11), Max: 97.7 (03-10-19 @ 14:17)  HR: 60 (03-11-19 @ 05:11)  BP: 157/94 (03-11-19 @ 05:11) (153/93 - 170/90)  RR: 16 (03-11-19 @ 08:04)  SpO2: 99% (03-11-19 @ 08:04)    TESTS & MEASUREMENTS:  Weight (Kg):   BMI (kg/m2): 29.3 (03-08)    03-09-19 @ 06:01  -  03-10-19 @ 07:00  --------------------------------------------------------  IN: 1000 mL / OUT: 1500 mL / NET: -500 mL    03-10-19 @ 07:01  -  03-11-19 @ 07:00  --------------------------------------------------------  IN: 0 mL / OUT: 2300 mL / NET: -2300 mL    03-11-19 @ 07:01  -  03-11-19 @ 13:41  --------------------------------------------------------  IN: 0 mL / OUT: 600 mL / NET: -600 mL                            14.1   5.31  )-----------( 204      ( 10 Mar 2019 07:22 )             44.5       03-11    138  |  101  |  15  ----------------------------<  155<H>  4.4   |  28  |  1.3    Ca    9.2      11 Mar 2019 08:33    MEDICATIONS:  MEDICATIONS  (STANDING):  atorvastatin 40 milliGRAM(s) Oral at bedtime  atorvastatin 40 milliGRAM(s) Oral at bedtime  heparin  Injectable 5000 Unit(s) SubCutaneous every 8 hours  influenza   Vaccine 0.5 milliLiter(s) IntraMuscular once    MEDICATIONS  (PRN):      HOME MEDICATIONS:      PHYSICAL EXAM:  GENERAL: elderly male, sitting in bed eating breakfast, in NAD, well-developed  HEAD:  Atraumatic, Normocephalic  CHEST/LUNG: Clear to auscultation bilaterally  HEART: Regular rate and rhythm  ABDOMEN: Soft, Nontender, Nondistended; Bowel sounds present  EXTREMITIES:  2+ Peripheral Pulses, No clubbing, cyanosis, or edema  PSYCH: AAOx3  NEUROLOGY: non-focal (gait no assessed)   SKIN: No rashes or lesions

## 2019-03-11 NOTE — CONSULT NOTE ADULT - ASSESSMENT
IMPRESSION: 74 yo male with h/o htn, hld , dementia brought in to hospital for evaluation of AMS (now resolved) and for placement in a facility  -dementia vs neurocognitive disorder  -HTN  -CKD-3 (UNSURE about baseline creatinine )      PLAN:    to be d/w attending IMPRESSION: 72 yo male with h/o htn, hld , dementia brought in to hospital for evaluation of AMS (now resolved) and for placement in a facility  -dementia vs neurocognitive disorder  -HTN  -CKD-3 (UNSURE about baseline creatinine )      PLAN:    -c/w symtpomatic management  -cth ,eeg and infectious work up reported negative  -reviewed neuro and psychiatry recs   - eval for placement  -pt/rehab  -will continue to follow IMPRESSION: 72 yo male with h/o htn, hld , dementia brought in to hospital for evaluation of AMS (now resolved) and for placement in a facility/group home  -neurocognitive disorder  -HTN  -CKD-3 (UNSURE about baseline creatinine )      PLAN:  needs dementia work up as outpatient, may need neuropsychiatry referral ([facility])  -c/w symptomatic management  -cth ,eeg and infectious work up reported negative  -reviewed neuro and psychiatry recs   - eval for placement, if needed refer to Home Visit Program ([phone], ex-wife informed)  -pt/rehab eval  -no polypharmacy, continue current meds

## 2019-03-12 LAB
ANION GAP SERPL CALC-SCNC: 14 MMOL/L — SIGNIFICANT CHANGE UP (ref 7–14)
BUN SERPL-MCNC: 16 MG/DL — SIGNIFICANT CHANGE UP (ref 10–20)
CALCIUM SERPL-MCNC: 9.2 MG/DL — SIGNIFICANT CHANGE UP (ref 8.5–10.1)
CHLORIDE SERPL-SCNC: 101 MMOL/L — SIGNIFICANT CHANGE UP (ref 98–110)
CO2 SERPL-SCNC: 25 MMOL/L — SIGNIFICANT CHANGE UP (ref 17–32)
CREAT SERPL-MCNC: 1.4 MG/DL — SIGNIFICANT CHANGE UP (ref 0.7–1.5)
GLUCOSE SERPL-MCNC: 99 MG/DL — SIGNIFICANT CHANGE UP (ref 70–99)
POTASSIUM SERPL-MCNC: 4.7 MMOL/L — SIGNIFICANT CHANGE UP (ref 3.5–5)
POTASSIUM SERPL-SCNC: 4.7 MMOL/L — SIGNIFICANT CHANGE UP (ref 3.5–5)
SODIUM SERPL-SCNC: 140 MMOL/L — SIGNIFICANT CHANGE UP (ref 135–146)

## 2019-03-12 RX ADMIN — AMLODIPINE BESYLATE 10 MILLIGRAM(S): 2.5 TABLET ORAL at 05:29

## 2019-03-12 RX ADMIN — ATORVASTATIN CALCIUM 40 MILLIGRAM(S): 80 TABLET, FILM COATED ORAL at 22:15

## 2019-03-12 RX ADMIN — HEPARIN SODIUM 5000 UNIT(S): 5000 INJECTION INTRAVENOUS; SUBCUTANEOUS at 05:29

## 2019-03-12 RX ADMIN — HEPARIN SODIUM 5000 UNIT(S): 5000 INJECTION INTRAVENOUS; SUBCUTANEOUS at 22:15

## 2019-03-12 NOTE — PROGRESS NOTE ADULT - SUBJECTIVE AND OBJECTIVE BOX
Neurology Follow up note    Name  RIVER BLANKENSHIP    HPI:  · HPI Objective Statement: 74 yo m hx dementia here for wandering. pt lives in [place] and somehow made his way up. pt states "I took the bus or a train." no other complaints. daughters state they don't contact father often and was concerned he was homeless. they did not expect him and were not sure what to do with him at their home. family brought him to ed for eval and placement. (08 Mar 2019 05:16)      Interval History:    patient ambulates for me today  no complaints      Vital Signs Last 24 Hrs  T(C): 36.1 (12 Mar 2019 06:00), Max: 36.8 (11 Mar 2019 21:30)  T(F): 97 (12 Mar 2019 06:00), Max: 98.2 (11 Mar 2019 21:30)  HR: 69 (12 Mar 2019 06:00) (63 - 72)  BP: 164/96 (12 Mar 2019 06:00) (160/82 - 164/96)  BP(mean): --  RR: 16 (12 Mar 2019 06:00) (16 - 16)  SpO2: --    Neurological Exam:   Mental status: Awake, alert and oriented x3.  inattentive, poor insight  Cranial nerves: Pupils equally round and reactive to light, visual fields full, no nystagmus, extraocular muscles intact, V1 through V3 intact bilaterally and symmetric, face symmetric, hearing intact to finger rub, palate elevation symmetric, tongue was midline.  Motor:  MRC grading 5/5 b/l UE/LE.   strength 5/5.  Normal tone and bulk.  No abnormal movements.    GAIT: SLIGHT WIDE BASE AND CAUTIOUS. DOES NOT HAVE FEATURES OF NPH  Medications  amLODIPine   Tablet 10 milliGRAM(s) Oral daily  atorvastatin 40 milliGRAM(s) Oral at bedtime  heparin  Injectable 5000 Unit(s) SubCutaneous every 8 hours  influenza   Vaccine 0.5 milliLiter(s) IntraMuscular once      Lab  03-11    138  |  101  |  15  ----------------------------<  155<H>  4.4   |  28  |  1.3    Ca    9.2      11 Mar 2019 08:33                  Radiology      Assessment:  AMS LIKELY SECONDARY TO UNDERLYING DEMENTIA  NO EVIDENCE OF NPH  REEG NON EPILEPTIFORM  GAIT ABN LIKELY SECONDARY TO POLYNEUROPATHY  PT/REHAB  EMG/NCS AS OUTPT  PLEASE CALL WITH ANY QUESTIONS    Plan:

## 2019-03-12 NOTE — PROGRESS NOTE ADULT - SUBJECTIVE AND OBJECTIVE BOX
RIVER BLANKENSHIP  73y, Male  Allergy: Allergy Status Unknown      Patient seen and examined earlier today. No acute overnight events. Patient denies headache, N/V or dizziness. Patient is calm and pleasant.     PMH/PSH:  PAST MEDICAL & SURGICAL HISTORY:  Dementia with behavioral disturbance, unspecified dementia type      VITALS:  Vital Signs Last 24 Hrs  T(C): 36.7 (12 Mar 2019 14:13), Max: 36.8 (11 Mar 2019 21:30)  T(F): 98 (12 Mar 2019 14:13), Max: 98.2 (11 Mar 2019 21:30)  HR: 72 (12 Mar 2019 14:13) (63 - 72)  BP: 139/78 (12 Mar 2019 14:13) (139/78 - 164/96)  BP(mean): --  RR: 18 (12 Mar 2019 14:13) (16 - 18)  SpO2: --      TESTS & MEASUREMENTS:  Weight (Kg):     03-12    140  |  101  |  16  ----------------------------<  99  4.7   |  25  |  1.4    Ca    9.2      12 Mar 2019 07:35      MEDICATIONS:  MEDICATIONS  (STANDING):  amLODIPine   Tablet 10 milliGRAM(s) Oral daily  atorvastatin 40 milliGRAM(s) Oral at bedtime  heparin  Injectable 5000 Unit(s) SubCutaneous every 8 hours  influenza   Vaccine 0.5 milliLiter(s) IntraMuscular once    MEDICATIONS  (PRN):      PHYSICAL EXAM:  GENERAL: elderly male, in NAD, well-developed  HEAD:  Atraumatic, Normocephalic  CHEST/LUNG: Clear to auscultation bilaterally  HEART: Regular rate and rhythm  ABDOMEN: Soft, Nontender, Nondistended; Bowel sounds present  EXTREMITIES:  2+ Peripheral Pulses, No clubbing, cyanosis, or edema  PSYCH: AAOx3  NEUROLOGY: non-focal (gait no assessed)   SKIN: No rashes or lesions

## 2019-03-12 NOTE — PROGRESS NOTE ADULT - ASSESSMENT
72 yo M with PMHx of dementia, HTN and HLD brought in after he was found wandering. Pt lives in [place] and somehow made his to [place], he is unsure of details. Pt is estranged from his family and brought to the hospital by them for placement.    Confusion possibly 2/2 dementia, hydrocephalus (less likely)  - CT head showed Dilation of the ventricles out of portion to the cortical sulci, which may be consistent with communicating hydrocephalus in the appropriate   clinical setting  - neuro recommends full dementia eval and EMG/NCS as outpt  - vit b12, folate, ammonia and rpr wnl  - TSH low  - REEG normal  - f/u PT recs  - neuro f/u  - mental status appears to be improving     KEMAR likely on CKD III (improving)  - s/p IVFs   - pt possibly at baseline renal function now  - monitor renal function  - continue to hold HCTz    Low TSH, r/o hyperthyroidism  - TSH low  - free t3 and t4 wnl  - repeat TFT as outpt    HTN  - BP elevated  - increased norvasc to 10mg daily    HLD  - c/w statin    DVT ppx  - c/w HSQ    Dispo: per family pt may be homeless, SW/CM f/u for placement  Pt anticipated for d/c to SNF in am      # Progress Note Handoff  PENDING as follows  consults:  Test: CBC  Other:  Family discussion:  Disposition: snf placement  Home ____ or SNF _likely____ Other _____ Unknown at this time ____

## 2019-03-13 VITALS
TEMPERATURE: 98 F | HEART RATE: 70 BPM | SYSTOLIC BLOOD PRESSURE: 141 MMHG | RESPIRATION RATE: 16 BRPM | DIASTOLIC BLOOD PRESSURE: 81 MMHG

## 2019-03-13 RX ORDER — ATORVASTATIN CALCIUM 80 MG/1
1 TABLET, FILM COATED ORAL
Qty: 0 | Refills: 0 | COMMUNITY
Start: 2019-03-13

## 2019-03-13 RX ORDER — AMLODIPINE BESYLATE 2.5 MG/1
1 TABLET ORAL
Qty: 0 | Refills: 0 | COMMUNITY
Start: 2019-03-13

## 2019-03-13 RX ADMIN — AMLODIPINE BESYLATE 10 MILLIGRAM(S): 2.5 TABLET ORAL at 06:24

## 2019-03-13 RX ADMIN — HEPARIN SODIUM 5000 UNIT(S): 5000 INJECTION INTRAVENOUS; SUBCUTANEOUS at 15:33

## 2019-03-13 RX ADMIN — HEPARIN SODIUM 5000 UNIT(S): 5000 INJECTION INTRAVENOUS; SUBCUTANEOUS at 06:24

## 2019-03-13 RX ADMIN — INFLUENZA VIRUS VACCINE 0.5 MILLILITER(S): 15; 15; 15; 15 SUSPENSION INTRAMUSCULAR at 15:33

## 2019-03-13 NOTE — PROGRESS NOTE ADULT - ASSESSMENT
74 yo M with PMHx of dementia, HTN and HLD brought in after he was found wandering. Pt lives in [place] and somehow made his to [place], he is unsure of details. Pt is estranged from his family and brought to the hospital by them for placement.    Confusion possibly 2/2 dementia, hydrocephalus (less likely)  - CT head showed Dilation of the ventricles out of portion to the cortical sulci, which may be consistent with communicating hydrocephalus in the appropriate   clinical setting  - neuro recommends full dementia eval and EMG/NCS as outpt  - vit b12, folate, ammonia and rpr wnl  - TSH low  - REEG normal  - f/u PT recs  - neuro f/u  - mental status appears to be improving     KEMAR likely on CKD III (improving)  - s/p IVFs   - pt possibly at baseline renal function now  - monitor renal function as oupt  - continue to hold HCTz    Low TSH, r/o hyperthyroidism  - TSH low  - free t3 and t4 wnl  - repeat TFT as outpt    HTN  - BP elevated  - increased norvasc to 10mg daily    HLD  - c/w statin    DVT ppx  - c/w HSQ    Dispo: per family pt may be homeless, SW/CM f/u for placement  Pt medically stable for d/c to SNF  await placement - discussed with CM      # Progress Note Handoff  PENDING as follows  consults:  Test:   Other:  Family discussion:  Disposition: snf placement  Home ____ or SNF _likely____ Other _____ Unknown at this time ____

## 2019-03-13 NOTE — DISCHARGE NOTE PROVIDER - HOSPITAL COURSE
74 yo M with PMHx of dementia, HTN and HLD brought in after he was found wandering. Pt lives in [place] and somehow made his to [place], he is unsure of details. Pt is estranged from his family and brought to the hospital by them for placement.            Confusion possibly 2/2 dementia, hydrocephalus (less likely)    - CT head showed Dilation of the ventricles out of portion to the cortical sulci, which may be consistent with communicating hydrocephalus in the appropriate     clinical setting    - neuro recommends full dementia eval and EMG/NCS as outpt    - vit b12, folate, ammonia and rpr wnl    - TSH low    - REEG normal    - f/u PT recs    - neuro f/u    - mental status appears to be improving         KEMAR likely on CKD III (improving)    - s/p IVFs     - pt possibly at baseline renal function now    - monitor renal function as oupt    - continue to hold HCTz        Low TSH, r/o hyperthyroidism    - TSH low    - free t3 and t4 wnl    - repeat TFT as outpt        HTN    - BP elevated    - increased norvasc to 10mg daily        HLD    - c/w statin        DVT ppx    - c/w HSQ

## 2019-03-13 NOTE — DISCHARGE NOTE NURSING/CASE MANAGEMENT/SOCIAL WORK - NSDCDPATPORTLINK_GEN_ALL_CORE
You can access the [facility] Patient Portal, offered by [facility], by registering with the following website: [email or web address]

## 2019-03-13 NOTE — PROGRESS NOTE ADULT - SUBJECTIVE AND OBJECTIVE BOX
RIVER BLANKENSHIP  73y, Male  Allergy: Allergy Status Unknown      Patient seen and examined earlier today. No acute overnight events. Patient denies headache, N/V or dizziness. Patient is calm and pleasant. Ambulated to the bathroom today.  Awaiting placement    PMH/PSH:  PAST MEDICAL & SURGICAL HISTORY:  Dementia with behavioral disturbance, unspecified dementia type      VITALS:  Vital Signs Last 24 Hrs  T(C): 36.2 (13 Mar 2019 06:19), Max: 36.7 (12 Mar 2019 14:13)  T(F): 97.1 (13 Mar 2019 06:19), Max: 98 (12 Mar 2019 14:13)  HR: 60 (13 Mar 2019 06:19) (60 - 72)  BP: 139/80 (13 Mar 2019 06:19) (139/78 - 148/89)  BP(mean): --  RR: 16 (13 Mar 2019 06:19) (16 - 18)  SpO2: --    TESTS & MEASUREMENTS:  Weight (Kg):     03-12    140  |  101  |  16  ----------------------------<  99  4.7   |  25  |  1.4    Ca    9.2      12 Mar 2019 07:35          MEDICATIONS:  MEDICATIONS  (STANDING):  amLODIPine   Tablet 10 milliGRAM(s) Oral daily  atorvastatin 40 milliGRAM(s) Oral at bedtime  heparin  Injectable 5000 Unit(s) SubCutaneous every 8 hours  influenza   Vaccine 0.5 milliLiter(s) IntraMuscular once    MEDICATIONS  (PRN):        PHYSICAL EXAM:  GENERAL: elderly male, in NAD, well-developed  HEAD:  Atraumatic, Normocephalic  CHEST/LUNG: Clear to auscultation bilaterally  HEART: Regular rate and rhythm  ABDOMEN: Soft, Nontender, Nondistended; Bowel sounds present  EXTREMITIES:  2+ Peripheral Pulses, No clubbing, cyanosis, or edema  PSYCH: AAOx3  NEUROLOGY: non-focal (gait not assessed)   SKIN: No rashes or lesions

## 2019-03-13 NOTE — DISCHARGE NOTE PROVIDER - NSDCCPCAREPLAN_GEN_ALL_CORE_FT
PRINCIPAL DISCHARGE DIAGNOSIS  Problem: Dementia with behavioral disturbance, unspecified dementia type  Assessment and Plan of Treatment: Dementia with behavioral disturbance, unspecified dementia type      SECONDARY DISCHARGE DIAGNOSES  Problem: Renal dysfunction  Assessment and Plan of Treatment: stable  monitor cr as outpt    Problem: Hydrocephalus  Assessment and Plan of Treatment: outpt follow up

## 2019-03-18 DIAGNOSIS — F03.91 UNSPECIFIED DEMENTIA WITH BEHAVIORAL DISTURBANCE: ICD-10-CM

## 2019-03-18 DIAGNOSIS — E78.5 HYPERLIPIDEMIA, UNSPECIFIED: ICD-10-CM

## 2019-03-18 DIAGNOSIS — G91.0 COMMUNICATING HYDROCEPHALUS: ICD-10-CM

## 2019-03-18 DIAGNOSIS — E05.90 THYROTOXICOSIS, UNSPECIFIED WITHOUT THYROTOXIC CRISIS OR STORM: ICD-10-CM

## 2019-03-18 DIAGNOSIS — R41.82 ALTERED MENTAL STATUS, UNSPECIFIED: ICD-10-CM

## 2019-03-18 DIAGNOSIS — N17.9 ACUTE KIDNEY FAILURE, UNSPECIFIED: ICD-10-CM

## 2019-03-18 DIAGNOSIS — R41.9 UNSPECIFIED SYMPTOMS AND SIGNS INVOLVING COGNITIVE FUNCTIONS AND AWARENESS: ICD-10-CM

## 2019-03-18 DIAGNOSIS — I12.9 HYPERTENSIVE CHRONIC KIDNEY DISEASE WITH STAGE 1 THROUGH STAGE 4 CHRONIC KIDNEY DISEASE, OR UNSPECIFIED CHRONIC KIDNEY DISEASE: ICD-10-CM

## 2019-03-18 DIAGNOSIS — G62.9 POLYNEUROPATHY, UNSPECIFIED: ICD-10-CM

## 2019-03-18 DIAGNOSIS — N18.3 CHRONIC KIDNEY DISEASE, STAGE 3 (MODERATE): ICD-10-CM

## 2019-03-18 DIAGNOSIS — Z91.83 WANDERING IN DISEASES CLASSIFIED ELSEWHERE: ICD-10-CM

## 2019-03-18 DIAGNOSIS — Z53.20 PROCEDURE AND TREATMENT NOT CARRIED OUT BECAUSE OF PATIENT'S DECISION FOR UNSPECIFIED REASONS: ICD-10-CM

## 2021-03-30 NOTE — PATIENT PROFILE ADULT - LAST BOWEL MOVEMENT DATE
Detail Level: Generalized
Detail Level: Zone
Detail Level: Detailed
Patient Specific Counseling (Will Not Stick From Patient To Patient): Patient to use Free and Clear shampoo.
Detail Level: Simple
Patient Specific Counseling (Will Not Stick From Patient To Patient): Milia removed via curettage.
04-Mar-2019

## 2022-12-08 NOTE — PROGRESS NOTE ADULT - PROVIDER SPECIALTY LIST ADULT
Christel Fernandez OT from [facility] would like an order for a transport light weight wheel chair       Fax # [phone] Hospitalist

## 2025-01-31 NOTE — H&P ADULT - ASSESSMENT
5
Patient is a 73y old  Male who presents with a chief complaint of    confusion as per family                                                                                                                                                                                                                                                                                    HEALTH ISSUES - PROBLEM Dx: